# Patient Record
Sex: MALE | Race: WHITE
[De-identification: names, ages, dates, MRNs, and addresses within clinical notes are randomized per-mention and may not be internally consistent; named-entity substitution may affect disease eponyms.]

---

## 2021-08-13 ENCOUNTER — HOSPITAL ENCOUNTER (OUTPATIENT)
Dept: HOSPITAL 76 - SDS | Age: 76
Discharge: HOME | End: 2021-08-13
Attending: SURGERY
Payer: MEDICARE

## 2021-08-13 VITALS — DIASTOLIC BLOOD PRESSURE: 67 MMHG | SYSTOLIC BLOOD PRESSURE: 116 MMHG

## 2021-08-13 DIAGNOSIS — D12.5: ICD-10-CM

## 2021-08-13 DIAGNOSIS — K63.5: ICD-10-CM

## 2021-08-13 DIAGNOSIS — J44.9: ICD-10-CM

## 2021-08-13 DIAGNOSIS — K57.30: ICD-10-CM

## 2021-08-13 DIAGNOSIS — R93.3: Primary | ICD-10-CM

## 2021-08-13 DIAGNOSIS — I48.91: ICD-10-CM

## 2021-08-13 DIAGNOSIS — Z85.820: ICD-10-CM

## 2021-08-13 DIAGNOSIS — D12.2: ICD-10-CM

## 2021-08-13 PROCEDURE — 0DBL8ZZ EXCISION OF TRANSVERSE COLON, VIA NATURAL OR ARTIFICIAL OPENING ENDOSCOPIC: ICD-10-PCS | Performed by: SURGERY

## 2021-08-13 PROCEDURE — 45385 COLONOSCOPY W/LESION REMOVAL: CPT

## 2021-08-13 PROCEDURE — 0DBN8ZZ EXCISION OF SIGMOID COLON, VIA NATURAL OR ARTIFICIAL OPENING ENDOSCOPIC: ICD-10-PCS | Performed by: SURGERY

## 2021-08-13 PROCEDURE — 45380 COLONOSCOPY AND BIOPSY: CPT

## 2021-08-13 PROCEDURE — 0DBK8ZZ EXCISION OF ASCENDING COLON, VIA NATURAL OR ARTIFICIAL OPENING ENDOSCOPIC: ICD-10-PCS | Performed by: SURGERY

## 2021-08-13 NOTE — ANESTHESIA
Pre-Anesthesia VS, & Labs





- Diagnosis





hx of polyps





- Procedure





colonoscopy


Vital Signs: 





                                        











Temp Pulse Resp BP Pulse Ox


 


 37.1 C   68   15   115/79   97 


 


 08/13/21 10:19  08/13/21 10:19  08/13/21 10:19  08/13/21 10:19  08/13/21 10:19











Height: 5 ft 6 in


Weight (kg): 61.1 kg


Body Mass Index: 21.7


BMI Classification: Healthy weight





- NPO


>8 hours





- Lab Results


Lab results reviewed: Yes





Home Medications and Allergies


Home Medications: 


Ambulatory Orders





Apixaban [Eliquis] 5 mg PO BID 08/12/21 


Atorvastatin [Lipitor] 20 mg PO DAILY 08/12/21 


Valsartan 320 mg PO DAILY 08/12/21 


Diltiazem HCl [Diltiazem 12Hr ER] 180 mg PO DAILY 08/13/21 


Fluticasone/Umeclidin/Vilanter [Trelegy Ellipta 200-62.5-25] 1 each IH DAILY 

08/13/21 











                                        





Apixaban [Eliquis] 5 mg PO BID 08/12/21 


Atorvastatin [Lipitor] 20 mg PO DAILY 08/12/21 


Valsartan 320 mg PO DAILY 08/12/21 


Diltiazem HCl [Diltiazem 12Hr ER] 180 mg PO DAILY 08/13/21 


Fluticasone/Umeclidin/Vilanter [Trelegy Ellipta 200-62.5-25] 1 each IH DAILY 

08/13/21 








Allergies/Adverse Reactions: 


                                    Allergies











Allergy/AdvReac Type Severity Reaction Status Date / Time


 


Sulfa (Sulfonamide Allergy  Rash Verified 08/13/21 10:31





Antibiotics)     














Anes History & Medical History





- Anesthetic History


Anesthesia Complications: reports: No previous complications


Family history of Anesthesia Complications: Denies


Family history of Malignant Hyperthermia: Denies





- Medical History


Cardiovascular: reports: Hypertension, High cholesterol


Pulmonary: reports: COPD


Gastrointestinal: reports: None


Urinary: reports: None, Other


Musculoskeletal: reports: None


Endocrine/Autoimmune: reports: None


Skin: reports: Other





- Surgical History


Eyes Ears Nose Throat (EENT): reports: Other


Cardiothoracic: reports: Other





Exam


General: Alert, Oriented x3, Cooperative


Dental: WNL


Mouth Opening: 3 Fingerbreadth


Neck Mobility: Normal


Mallampati classification: II


Thyromental Distance: 4-6 cm


Respiratory: Lungs clear


Cardiovascular: Regular rate





Plan


Anesthesia Type: Total IV


Consent for Procedure(s) Verified and Reviewed: Yes


Code Status: Attempt Resuscitation


ASA classification: 3-Severe systemic disease


Is this case an emergency?: No

## 2023-05-15 ENCOUNTER — HOSPITAL ENCOUNTER (OUTPATIENT)
Dept: HOSPITAL 76 - DI | Age: 78
Discharge: HOME | End: 2023-05-15
Attending: INTERNAL MEDICINE
Payer: MEDICARE

## 2023-05-15 DIAGNOSIS — R91.8: Primary | ICD-10-CM

## 2023-05-24 ENCOUNTER — HOSPITAL ENCOUNTER (OUTPATIENT)
Dept: HOSPITAL 76 - LAB.N | Age: 78
Discharge: HOME | End: 2023-05-24
Attending: NURSE PRACTITIONER
Payer: MEDICARE

## 2023-05-24 DIAGNOSIS — M25.461: Primary | ICD-10-CM

## 2023-05-24 LAB
CLARITY FLD: (no result)
COLOR FLD: YELLOW
MONOCYTES NFR FLD: 6 %
NEUTROPHILS NFR FLD MANUAL: 94 %
RBC # FLD: 1000 /MM^3
SPECIMEN SOURCE FLD: (no result)
WBC OTHER NFR FLD: 7320 /MM^3

## 2023-05-24 PROCEDURE — 87070 CULTURE OTHR SPECIMN AEROBIC: CPT

## 2023-05-24 PROCEDURE — 89060 EXAM SYNOVIAL FLUID CRYSTALS: CPT

## 2023-05-24 PROCEDURE — 87205 SMEAR GRAM STAIN: CPT

## 2023-05-24 PROCEDURE — 89051 BODY FLUID CELL COUNT: CPT

## 2023-05-24 PROCEDURE — 82945 GLUCOSE OTHER FLUID: CPT

## 2023-05-31 ENCOUNTER — HOSPITAL ENCOUNTER (OUTPATIENT)
Dept: HOSPITAL 76 - DI.N | Age: 78
Discharge: HOME | End: 2023-05-31
Attending: NURSE PRACTITIONER
Payer: MEDICARE

## 2023-05-31 DIAGNOSIS — M25.461: Primary | ICD-10-CM

## 2024-05-06 ENCOUNTER — HOSPITAL ENCOUNTER (EMERGENCY)
Dept: HOSPITAL 76 - ED | Age: 79
Discharge: HOME | End: 2024-05-06
Payer: MEDICARE

## 2024-05-06 VITALS — OXYGEN SATURATION: 96 % | DIASTOLIC BLOOD PRESSURE: 82 MMHG | SYSTOLIC BLOOD PRESSURE: 136 MMHG

## 2024-05-06 DIAGNOSIS — Z79.01: ICD-10-CM

## 2024-05-06 DIAGNOSIS — Z79.899: ICD-10-CM

## 2024-05-06 DIAGNOSIS — I10: ICD-10-CM

## 2024-05-06 DIAGNOSIS — J18.9: Primary | ICD-10-CM

## 2024-05-06 DIAGNOSIS — J44.9: ICD-10-CM

## 2024-05-06 DIAGNOSIS — E78.00: ICD-10-CM

## 2024-05-06 LAB
ALBUMIN DIAFP-MCNC: 3.6 G/DL (ref 3.2–5.5)
ALBUMIN/GLOB SERPL: 1.4 {RATIO} (ref 1–2.2)
ALP SERPL-CCNC: 74 IU/L (ref 42–121)
ALT SERPL W P-5'-P-CCNC: 15 IU/L (ref 10–60)
ANION GAP SERPL CALCULATED.4IONS-SCNC: 5 MMOL/L (ref 6–13)
AST SERPL W P-5'-P-CCNC: 16 IU/L (ref 10–42)
BASOPHILS NFR BLD AUTO: 0.1 10^3/UL (ref 0–0.1)
BASOPHILS NFR BLD AUTO: 0.5 %
BILIRUB BLD-MCNC: 0.4 MG/DL (ref 0.2–1)
BUN SERPL-MCNC: 19 MG/DL (ref 6–20)
CALCIUM UR-MCNC: 9.7 MG/DL (ref 8.5–10.3)
CHLORIDE SERPL-SCNC: 106 MMOL/L (ref 101–111)
CLARITY UR REFRACT.AUTO: CLEAR
CO2 SERPL-SCNC: 32 MMOL/L (ref 21–32)
CREAT SERPLBLD-SCNC: 1.1 MG/DL (ref 0.6–1.3)
EOSINOPHIL # BLD AUTO: 0.1 10^3/UL (ref 0–0.7)
EOSINOPHIL NFR BLD AUTO: 0.6 %
ERYTHROCYTE [DISTWIDTH] IN BLOOD BY AUTOMATED COUNT: 14 % (ref 12–15)
GFRSERPLBLD MDRD-ARVRAT: 65 ML/MIN/{1.73_M2} (ref 89–?)
GLOBULIN SER-MCNC: 2.6 G/DL (ref 2.1–4.2)
GLUCOSE SERPL-MCNC: 133 MG/DL (ref 74–104)
GLUCOSE UR QL STRIP.AUTO: NEGATIVE MG/DL
HCT VFR BLD AUTO: 48.6 % (ref 42–52)
HGB UR QL STRIP: 15.1 G/DL (ref 14–18)
KETONES UR QL STRIP.AUTO: (no result) MG/DL
LIPASE SERPL-CCNC: 27 U/L (ref 11–82)
LYMPHOCYTES # SPEC AUTO: 1.1 10^3/UL (ref 1.5–3.5)
LYMPHOCYTES NFR BLD AUTO: 8.9 %
MCH RBC QN AUTO: 29.2 PG (ref 27–31)
MCHC RBC AUTO-ENTMCNC: 31.1 G/DL (ref 32–36)
MCV RBC AUTO: 94 FL (ref 80–94)
MONOCYTES # BLD AUTO: 0.9 10^3/UL (ref 0–1)
MONOCYTES NFR BLD AUTO: 7.5 %
NEUTROPHILS # BLD AUTO: 10.2 10^3/UL (ref 1.5–6.6)
NEUTROPHILS # SNV AUTO: 12.4 X10^3/UL (ref 4.8–10.8)
NEUTROPHILS NFR BLD AUTO: 81.9 %
NITRITE UR QL STRIP.AUTO: NEGATIVE
NRBC # BLD AUTO: 0 /100WBC
NRBC # BLD AUTO: 0 X10^3/UL
PDW BLD AUTO: 8.9 FL (ref 7.4–11.4)
PH UR STRIP.AUTO: 6 PH (ref 5–7.5)
PLATELET # BLD: 359 10^3/UL (ref 130–450)
POTASSIUM SERPL-SCNC: 4.6 MMOL/L (ref 3.5–4.5)
PROT SPEC-MCNC: 6.2 G/DL (ref 6.4–8.9)
PROT UR STRIP.AUTO-MCNC: (no result) MG/DL
RBC # UR STRIP.AUTO: NEGATIVE /UL
RBC MAR: 5.17 10^6/UL (ref 4.7–6.1)
SODIUM SERPLBLD-SCNC: 143 MMOL/L (ref 135–145)
SP GR UR STRIP.AUTO: 1.02 (ref 1–1.03)
UROBILINOGEN UR QL STRIP.AUTO: (no result) E.U./DL
UROBILINOGEN UR STRIP.AUTO-MCNC: NEGATIVE MG/DL

## 2024-05-06 PROCEDURE — 81003 URINALYSIS AUTO W/O SCOPE: CPT

## 2024-05-06 PROCEDURE — 85025 COMPLETE CBC W/AUTO DIFF WBC: CPT

## 2024-05-06 PROCEDURE — 99284 EMERGENCY DEPT VISIT MOD MDM: CPT

## 2024-05-06 PROCEDURE — 83690 ASSAY OF LIPASE: CPT

## 2024-05-06 PROCEDURE — 87086 URINE CULTURE/COLONY COUNT: CPT

## 2024-05-06 PROCEDURE — 36415 COLL VENOUS BLD VENIPUNCTURE: CPT

## 2024-05-06 PROCEDURE — 80053 COMPREHEN METABOLIC PANEL: CPT

## 2024-05-06 PROCEDURE — 74177 CT ABD & PELVIS W/CONTRAST: CPT

## 2024-05-06 PROCEDURE — 71045 X-RAY EXAM CHEST 1 VIEW: CPT

## 2024-05-06 PROCEDURE — 81001 URINALYSIS AUTO W/SCOPE: CPT

## 2024-05-15 ENCOUNTER — HOSPITAL ENCOUNTER (EMERGENCY)
Dept: HOSPITAL 76 - ED | Age: 79
Discharge: HOME | End: 2024-05-15
Payer: MEDICARE

## 2024-05-15 VITALS — OXYGEN SATURATION: 94 % | SYSTOLIC BLOOD PRESSURE: 124 MMHG | DIASTOLIC BLOOD PRESSURE: 74 MMHG

## 2024-05-15 DIAGNOSIS — I71.21: ICD-10-CM

## 2024-05-15 DIAGNOSIS — R94.31: ICD-10-CM

## 2024-05-15 DIAGNOSIS — I25.10: ICD-10-CM

## 2024-05-15 DIAGNOSIS — R91.8: ICD-10-CM

## 2024-05-15 DIAGNOSIS — I10: ICD-10-CM

## 2024-05-15 DIAGNOSIS — I48.91: ICD-10-CM

## 2024-05-15 DIAGNOSIS — Z79.01: ICD-10-CM

## 2024-05-15 DIAGNOSIS — J18.9: Primary | ICD-10-CM

## 2024-05-15 DIAGNOSIS — J44.0: ICD-10-CM

## 2024-05-15 DIAGNOSIS — M54.6: ICD-10-CM

## 2024-05-15 DIAGNOSIS — I45.10: ICD-10-CM

## 2024-05-15 LAB
ALBUMIN DIAFP-MCNC: 3.7 G/DL (ref 3.2–5.5)
ALBUMIN/GLOB SERPL: 1.4 {RATIO} (ref 1–2.2)
ALP SERPL-CCNC: 66 IU/L (ref 42–121)
ALT SERPL W P-5'-P-CCNC: 16 IU/L (ref 10–60)
ANION GAP SERPL CALCULATED.4IONS-SCNC: 5 MMOL/L (ref 6–13)
AST SERPL W P-5'-P-CCNC: 21 IU/L (ref 10–42)
BASOPHILS NFR BLD AUTO: 0 10^3/UL (ref 0–0.1)
BASOPHILS NFR BLD AUTO: 0.3 %
BILIRUB BLD-MCNC: 0.5 MG/DL (ref 0.2–1)
BUN SERPL-MCNC: 24 MG/DL (ref 6–20)
CALCIUM UR-MCNC: 9.5 MG/DL (ref 8.5–10.3)
CARDIAC TROPONIN I PNL SERPL HS: 19.3 NG/L (ref 2.3–19.7)
CHLORIDE SERPL-SCNC: 104 MMOL/L (ref 101–111)
CO2 SERPL-SCNC: 30 MMOL/L (ref 21–32)
CREAT SERPLBLD-SCNC: 1.1 MG/DL (ref 0.6–1.3)
EOSINOPHIL # BLD AUTO: 0.2 10^3/UL (ref 0–0.7)
EOSINOPHIL NFR BLD AUTO: 2.4 %
ERYTHROCYTE [DISTWIDTH] IN BLOOD BY AUTOMATED COUNT: 14.6 % (ref 12–15)
GFRSERPLBLD MDRD-ARVRAT: 65 ML/MIN/{1.73_M2} (ref 89–?)
GLOBULIN SER-MCNC: 2.7 G/DL (ref 2.1–4.2)
GLUCOSE SERPL-MCNC: 109 MG/DL (ref 74–104)
HCT VFR BLD AUTO: 49.8 % (ref 42–52)
HGB UR QL STRIP: 15.4 G/DL (ref 14–18)
LIPASE SERPL-CCNC: 27 U/L (ref 11–82)
LYMPHOCYTES # SPEC AUTO: 1 10^3/UL (ref 1.5–3.5)
LYMPHOCYTES NFR BLD AUTO: 11.7 %
MCH RBC QN AUTO: 29.2 PG (ref 27–31)
MCHC RBC AUTO-ENTMCNC: 30.9 G/DL (ref 32–36)
MCV RBC AUTO: 94.3 FL (ref 80–94)
MONOCYTES # BLD AUTO: 0.5 10^3/UL (ref 0–1)
MONOCYTES NFR BLD AUTO: 5.4 %
NEUTROPHILS # BLD AUTO: 7.1 10^3/UL (ref 1.5–6.6)
NEUTROPHILS # SNV AUTO: 8.9 X10^3/UL (ref 4.8–10.8)
NEUTROPHILS NFR BLD AUTO: 79.8 %
NRBC # BLD AUTO: 0 /100WBC
NRBC # BLD AUTO: 0 X10^3/UL
PDW BLD AUTO: 9.8 FL (ref 7.4–11.4)
PLATELET # BLD: 174 10^3/UL (ref 130–450)
POTASSIUM SERPL-SCNC: 4.8 MMOL/L (ref 3.5–4.5)
PROT SPEC-MCNC: 6.4 G/DL (ref 6.4–8.9)
RBC MAR: 5.28 10^6/UL (ref 4.7–6.1)
SODIUM SERPLBLD-SCNC: 139 MMOL/L (ref 135–145)

## 2024-05-15 PROCEDURE — 80053 COMPREHEN METABOLIC PANEL: CPT

## 2024-05-15 PROCEDURE — 99284 EMERGENCY DEPT VISIT MOD MDM: CPT

## 2024-05-15 PROCEDURE — 84484 ASSAY OF TROPONIN QUANT: CPT

## 2024-05-15 PROCEDURE — 93005 ELECTROCARDIOGRAM TRACING: CPT

## 2024-05-15 PROCEDURE — 36415 COLL VENOUS BLD VENIPUNCTURE: CPT

## 2024-05-15 PROCEDURE — 71045 X-RAY EXAM CHEST 1 VIEW: CPT

## 2024-05-15 PROCEDURE — 96375 TX/PRO/DX INJ NEW DRUG ADDON: CPT

## 2024-05-15 PROCEDURE — 96374 THER/PROPH/DIAG INJ IV PUSH: CPT

## 2024-05-15 PROCEDURE — 99283 EMERGENCY DEPT VISIT LOW MDM: CPT

## 2024-05-15 PROCEDURE — 85025 COMPLETE CBC W/AUTO DIFF WBC: CPT

## 2024-05-15 PROCEDURE — 83690 ASSAY OF LIPASE: CPT

## 2024-05-15 PROCEDURE — 71275 CT ANGIOGRAPHY CHEST: CPT

## 2024-05-15 PROCEDURE — 96376 TX/PRO/DX INJ SAME DRUG ADON: CPT

## 2024-05-15 RX ADMIN — KETOROLAC TROMETHAMINE STA MG: 15 INJECTION, SOLUTION INTRAMUSCULAR; INTRAVENOUS at 07:57

## 2024-05-15 RX ADMIN — ONDANSETRON STA MG: 2 INJECTION INTRAMUSCULAR; INTRAVENOUS at 09:01

## 2024-05-15 RX ADMIN — HYDROMORPHONE HYDROCHLORIDE STA MG: 1 INJECTION, SOLUTION INTRAMUSCULAR; INTRAVENOUS; SUBCUTANEOUS at 07:57

## 2024-05-15 RX ADMIN — HYDROMORPHONE HYDROCHLORIDE STA MG: 1 INJECTION, SOLUTION INTRAMUSCULAR; INTRAVENOUS; SUBCUTANEOUS at 10:13

## 2024-06-10 ENCOUNTER — HOSPITAL ENCOUNTER (OUTPATIENT)
Dept: HOSPITAL 76 - DI | Age: 79
Discharge: HOME | End: 2024-06-10
Attending: FAMILY MEDICINE
Payer: MEDICARE

## 2024-06-10 DIAGNOSIS — J18.9: Primary | ICD-10-CM

## 2024-06-10 DIAGNOSIS — J90: ICD-10-CM

## 2024-06-25 ENCOUNTER — HOSPITAL ENCOUNTER (INPATIENT)
Dept: HOSPITAL 76 - ED | Age: 79
LOS: 4 days | Discharge: ANOTHER HOSPITAL | DRG: 175 | End: 2024-06-29
Attending: PHYSICIAN ASSISTANT | Admitting: PHYSICIAN ASSISTANT
Payer: MEDICARE

## 2024-06-25 DIAGNOSIS — I71.20: ICD-10-CM

## 2024-06-25 DIAGNOSIS — M62.84: ICD-10-CM

## 2024-06-25 DIAGNOSIS — E44.0: ICD-10-CM

## 2024-06-25 DIAGNOSIS — R91.8: ICD-10-CM

## 2024-06-25 DIAGNOSIS — J96.01: ICD-10-CM

## 2024-06-25 DIAGNOSIS — D69.6: ICD-10-CM

## 2024-06-25 DIAGNOSIS — J18.9: ICD-10-CM

## 2024-06-25 DIAGNOSIS — Z79.899: ICD-10-CM

## 2024-06-25 DIAGNOSIS — I11.9: ICD-10-CM

## 2024-06-25 DIAGNOSIS — Z66: ICD-10-CM

## 2024-06-25 DIAGNOSIS — Z86.79: ICD-10-CM

## 2024-06-25 DIAGNOSIS — I26.94: Primary | ICD-10-CM

## 2024-06-25 DIAGNOSIS — Z20.828: ICD-10-CM

## 2024-06-25 DIAGNOSIS — Z20.822: ICD-10-CM

## 2024-06-25 DIAGNOSIS — I5A: ICD-10-CM

## 2024-06-25 DIAGNOSIS — I10: ICD-10-CM

## 2024-06-25 DIAGNOSIS — Z20.818: ICD-10-CM

## 2024-06-25 DIAGNOSIS — J44.0: ICD-10-CM

## 2024-06-25 DIAGNOSIS — I45.2: ICD-10-CM

## 2024-06-25 DIAGNOSIS — Z79.01: ICD-10-CM

## 2024-06-25 DIAGNOSIS — R94.31: ICD-10-CM

## 2024-06-25 DIAGNOSIS — Z87.891: ICD-10-CM

## 2024-06-25 DIAGNOSIS — E78.00: ICD-10-CM

## 2024-06-25 DIAGNOSIS — J90: ICD-10-CM

## 2024-06-25 DIAGNOSIS — I48.0: ICD-10-CM

## 2024-06-25 DIAGNOSIS — F41.9: ICD-10-CM

## 2024-06-25 DIAGNOSIS — I25.10: ICD-10-CM

## 2024-06-25 LAB
ALBUMIN DIAFP-MCNC: 4 G/DL (ref 3.2–5.5)
ALBUMIN/GLOB SERPL: 1.4 {RATIO} (ref 1–2.2)
ALP SERPL-CCNC: 85 IU/L (ref 42–121)
ALT SERPL W P-5'-P-CCNC: 13 IU/L (ref 10–60)
ANION GAP SERPL CALCULATED.4IONS-SCNC: 7 MMOL/L (ref 6–13)
APTT PPP: 33.6 SECS (ref 24.9–33.3)
AST SERPL W P-5'-P-CCNC: 26 IU/L (ref 10–42)
B PARAPERT DNA SPEC QL NAA+PROBE: NOT DETECTED
B PERT DNA SPEC QL NAA+PROBE: NOT DETECTED
BASE EXCESS BLDV CALC-SCNC: 0.9 MMOL/L
BASOPHILS NFR BLD AUTO: 0 10^3/UL (ref 0–0.1)
BASOPHILS NFR BLD AUTO: 0.4 %
BILIRUB BLD-MCNC: 1 MG/DL (ref 0.2–1)
BUN SERPL-MCNC: 15 MG/DL (ref 6–20)
C PNEUM DNA NPH QL NAA+NON-PROBE: NOT DETECTED
CALCIUM UR-MCNC: 9.5 MG/DL (ref 8.5–10.3)
CHLORIDE SERPL-SCNC: 102 MMOL/L (ref 101–111)
CO2 BLDV-SCNC: 25.9 MMOL/L (ref 24–29)
CO2 SERPL-SCNC: 29 MMOL/L (ref 21–32)
CREAT SERPLBLD-SCNC: 0.9 MG/DL (ref 0.6–1.3)
EOSINOPHIL # BLD AUTO: 0 10^3/UL (ref 0–0.7)
EOSINOPHIL NFR BLD AUTO: 0.5 %
ERYTHROCYTE [DISTWIDTH] IN BLOOD BY AUTOMATED COUNT: 14.5 % (ref 12–15)
FLUAV RNA RESP QL NAA+PROBE: NOT DETECTED
GFRSERPLBLD MDRD-ARVRAT: 82 ML/MIN/{1.73_M2} (ref 89–?)
GLOBULIN SER-MCNC: 2.8 G/DL (ref 2.1–4.2)
GLUCOSE SERPL-MCNC: 100 MG/DL (ref 74–104)
HAEM INFLU B DNA SPEC QL NAA+PROBE: NOT DETECTED
HCO3 BLDMV-SCNC: 24.7 MMOL/L (ref 23–28)
HCOV 229E RNA SPEC QL NAA+PROBE: NOT DETECTED
HCOV HKU1 RNA UPPER RESP QL NAA+PROBE: NOT DETECTED
HCOV NL63 RNA ASPIRATE QL NAA+PROBE: NOT DETECTED
HCOV OC43 RNA SPEC QL NAA+PROBE: NOT DETECTED
HCT VFR BLD AUTO: 50.6 % (ref 42–52)
HGB UR QL STRIP: 16.3 G/DL (ref 14–18)
HMPV AG SPEC QL: DETECTED
HPIV1 RNA NPH QL NAA+PROBE: NOT DETECTED
HPIV2 SPEC QL CULT: NOT DETECTED
HPIV3 AB TITR SER CF: NOT DETECTED {TITER}
HPIV4 RNA SPEC QL NAA+PROBE: NOT DETECTED
INR PPP: 1.5 (ref 0.8–1.2)
INR PPP: 1.7 (ref 0.8–1.2)
LIPASE SERPL-CCNC: 21 U/L (ref 11–82)
LYMPHOCYTES # SPEC AUTO: 0.7 10^3/UL (ref 1.5–3.5)
LYMPHOCYTES NFR BLD AUTO: 9.9 %
M PNEUMO DNA SPEC QL NAA+PROBE: NOT DETECTED
MCH RBC QN AUTO: 29.2 PG (ref 27–31)
MCHC RBC AUTO-ENTMCNC: 32.2 G/DL (ref 32–36)
MCV RBC AUTO: 90.5 FL (ref 80–94)
MONOCYTES # BLD AUTO: 0.6 10^3/UL (ref 0–1)
MONOCYTES NFR BLD AUTO: 8.6 %
NEUTROPHILS # BLD AUTO: 6 10^3/UL (ref 1.5–6.6)
NEUTROPHILS # SNV AUTO: 7.5 X10^3/UL (ref 4.8–10.8)
NEUTROPHILS NFR BLD AUTO: 80.3 %
NRBC # BLD AUTO: 0 /100WBC
NRBC # BLD AUTO: 0 X10^3/UL
PCO2 BLDV: 37.3 MMHG (ref 41–51)
PDW BLD AUTO: 9.8 FL (ref 7.4–11.4)
PH BLDV: 7.44 [PH] (ref 7.31–7.41)
PLATELET # BLD: 203 10^3/UL (ref 130–450)
PO2 BLDV: 46.2 MMHG (ref 25–47)
POTASSIUM SERPL-SCNC: 4.2 MMOL/L (ref 3.5–4.5)
PROT SPEC-MCNC: 6.8 G/DL (ref 6.4–8.9)
PROTHROM ACT/NOR PPP: 16.3 SECS (ref 9.9–12.6)
PROTHROM ACT/NOR PPP: 17.6 SECS (ref 9.9–12.6)
RBC MAR: 5.59 10^6/UL (ref 4.7–6.1)
RSV RNA RESP QL NAA+PROBE: NOT DETECTED
RV+EV RNA SPEC QL NAA+PROBE: NOT DETECTED
SAO2 DF BLDV: 85.5 % (ref 60–80)
SARS-COV-2 RNA PNL SPEC NAA+PROBE: NOT DETECTED
SODIUM SERPLBLD-SCNC: 138 MMOL/L (ref 135–145)

## 2024-06-25 PROCEDURE — 85613 RUSSELL VIPER VENOM DILUTED: CPT

## 2024-06-25 PROCEDURE — 80048 BASIC METABOLIC PNL TOTAL CA: CPT

## 2024-06-25 PROCEDURE — 96374 THER/PROPH/DIAG INJ IV PUSH: CPT

## 2024-06-25 PROCEDURE — 97162 PT EVAL MOD COMPLEX 30 MIN: CPT

## 2024-06-25 PROCEDURE — 87640 STAPH A DNA AMP PROBE: CPT

## 2024-06-25 PROCEDURE — 97166 OT EVAL MOD COMPLEX 45 MIN: CPT

## 2024-06-25 PROCEDURE — 87205 SMEAR GRAM STAIN: CPT

## 2024-06-25 PROCEDURE — 96375 TX/PRO/DX INJ NEW DRUG ADDON: CPT

## 2024-06-25 PROCEDURE — 85732 THROMBOPLASTIN TIME PARTIAL: CPT

## 2024-06-25 PROCEDURE — 97530 THERAPEUTIC ACTIVITIES: CPT

## 2024-06-25 PROCEDURE — 99285 EMERGENCY DEPT VISIT HI MDM: CPT

## 2024-06-25 PROCEDURE — 85598 HEXAGNAL PHOSPH PLTLT NEUTRL: CPT

## 2024-06-25 PROCEDURE — 82803 BLOOD GASES ANY COMBINATION: CPT

## 2024-06-25 PROCEDURE — 85610 PROTHROMBIN TIME: CPT

## 2024-06-25 PROCEDURE — 85025 COMPLETE CBC W/AUTO DIFF WBC: CPT

## 2024-06-25 PROCEDURE — 93307 TTE W/O DOPPLER COMPLETE: CPT

## 2024-06-25 PROCEDURE — 97116 GAIT TRAINING THERAPY: CPT

## 2024-06-25 PROCEDURE — 80053 COMPREHEN METABOLIC PANEL: CPT

## 2024-06-25 PROCEDURE — 71045 X-RAY EXAM CHEST 1 VIEW: CPT

## 2024-06-25 PROCEDURE — 86147 CARDIOLIPIN ANTIBODY EA IG: CPT

## 2024-06-25 PROCEDURE — 94640 AIRWAY INHALATION TREATMENT: CPT

## 2024-06-25 PROCEDURE — 36415 COLL VENOUS BLD VENIPUNCTURE: CPT

## 2024-06-25 PROCEDURE — 84484 ASSAY OF TROPONIN QUANT: CPT

## 2024-06-25 PROCEDURE — 93005 ELECTROCARDIOGRAM TRACING: CPT

## 2024-06-25 PROCEDURE — 87633 RESP VIRUS 12-25 TARGETS: CPT

## 2024-06-25 PROCEDURE — 93970 EXTREMITY STUDY: CPT

## 2024-06-25 PROCEDURE — 71275 CT ANGIOGRAPHY CHEST: CPT

## 2024-06-25 PROCEDURE — 85730 THROMBOPLASTIN TIME PARTIAL: CPT

## 2024-06-25 PROCEDURE — 83690 ASSAY OF LIPASE: CPT

## 2024-06-25 PROCEDURE — 87070 CULTURE OTHR SPECIMN AEROBIC: CPT

## 2024-06-25 RX ADMIN — METHYLPREDNISOLONE SODIUM SUCCINATE STA: 125 INJECTION, POWDER, FOR SOLUTION INTRAMUSCULAR; INTRAVENOUS at 10:42

## 2024-06-25 RX ADMIN — METHYLPREDNISOLONE SODIUM SUCCINATE STA MG: 125 INJECTION, POWDER, FOR SOLUTION INTRAMUSCULAR; INTRAVENOUS at 10:58

## 2024-06-25 RX ADMIN — BUDESONIDE SCH MG: 0.5 SUSPENSION RESPIRATORY (INHALATION) at 19:04

## 2024-06-25 RX ADMIN — SODIUM CHLORIDE STA MLS/HR: 9 INJECTION, SOLUTION INTRAVENOUS at 18:10

## 2024-06-25 RX ADMIN — ATORVASTATIN CALCIUM SCH MG: 40 TABLET, FILM COATED ORAL at 21:01

## 2024-06-25 RX ADMIN — WARFARIN SODIUM SCH MG: 2.5 TABLET ORAL at 21:00

## 2024-06-25 RX ADMIN — DEXTROSE MONOHYDRATE STA MLS/HR: 50 INJECTION, SOLUTION INTRAVENOUS at 17:25

## 2024-06-25 RX ADMIN — ONDANSETRON STA MG: 2 INJECTION INTRAMUSCULAR; INTRAVENOUS at 10:53

## 2024-06-25 RX ADMIN — FORMOTEROL FUMARATE DIHYDRATE SCH MCG: 20 SOLUTION RESPIRATORY (INHALATION) at 19:04

## 2024-06-25 RX ADMIN — HEPARIN SODIUM SCH MLS/HR: 5000 INJECTION, SOLUTION INTRAVENOUS at 16:45

## 2024-06-26 LAB
ANION GAP SERPL CALCULATED.4IONS-SCNC: 6 MMOL/L (ref 6–13)
BASOPHILS NFR BLD AUTO: 0 10^3/UL (ref 0–0.1)
BASOPHILS NFR BLD AUTO: 0.2 %
BUN SERPL-MCNC: 25 MG/DL (ref 6–20)
CALCIUM UR-MCNC: 8.8 MG/DL (ref 8.5–10.3)
CARDIAC TROPONIN I PNL SERPL HS: 47.4 NG/L (ref 2.3–19.7)
CHLORIDE SERPL-SCNC: 107 MMOL/L (ref 101–111)
CO2 SERPL-SCNC: 28 MMOL/L (ref 21–32)
CREAT SERPLBLD-SCNC: 0.9 MG/DL (ref 0.6–1.3)
EOSINOPHIL # BLD AUTO: 0 10^3/UL (ref 0–0.7)
EOSINOPHIL NFR BLD AUTO: 0 %
ERYTHROCYTE [DISTWIDTH] IN BLOOD BY AUTOMATED COUNT: 14.5 % (ref 12–15)
GFRSERPLBLD MDRD-ARVRAT: 82 ML/MIN/{1.73_M2} (ref 89–?)
GLUCOSE SERPL-MCNC: 153 MG/DL (ref 74–104)
HCT VFR BLD AUTO: 44.6 % (ref 42–52)
HGB UR QL STRIP: 14.5 G/DL (ref 14–18)
INR PPP: 1.4 (ref 0.8–1.2)
LYMPHOCYTES # SPEC AUTO: 0.6 10^3/UL (ref 1.5–3.5)
LYMPHOCYTES NFR BLD AUTO: 13.5 %
MANUAL DIF COMMENT BLD-IMP: (no result)
MCH RBC QN AUTO: 30 PG (ref 27–31)
MCHC RBC AUTO-ENTMCNC: 32.5 G/DL (ref 32–36)
MCV RBC AUTO: 92.1 FL (ref 80–94)
MONOCYTES # BLD AUTO: 0.3 10^3/UL (ref 0–1)
MONOCYTES NFR BLD AUTO: 7.3 %
NEUTROPHILS # BLD AUTO: 3.5 10^3/UL (ref 1.5–6.6)
NEUTROPHILS # SNV AUTO: 4.5 X10^3/UL (ref 4.8–10.8)
NEUTROPHILS NFR BLD AUTO: 78.6 %
NRBC # BLD AUTO: 0 /100WBC
NRBC # BLD AUTO: 0 X10^3/UL
PDW BLD AUTO: 10.4 FL (ref 7.4–11.4)
PLAT MORPH BLD: (no result)
PLATELET # BLD: 153 10^3/UL (ref 130–450)
PLATELET BLD QL SMEAR: (no result)
POTASSIUM SERPL-SCNC: 4.4 MMOL/L (ref 3.5–4.5)
PROTHROM ACT/NOR PPP: 15.6 SECS (ref 9.9–12.6)
RBC MAR: 4.84 10^6/UL (ref 4.7–6.1)
RBC MORPH BLD: (no result)
SODIUM SERPLBLD-SCNC: 141 MMOL/L (ref 135–145)

## 2024-06-26 RX ADMIN — ATORVASTATIN CALCIUM SCH MG: 40 TABLET, FILM COATED ORAL at 08:51

## 2024-06-26 RX ADMIN — Medication SCH CAP: at 16:59

## 2024-06-26 RX ADMIN — IPRATROPIUM BROMIDE SCH MG: 0.5 SOLUTION RESPIRATORY (INHALATION) at 07:19

## 2024-06-26 RX ADMIN — HEPARIN SODIUM PRN UNIT: 1000 INJECTION, SOLUTION INTRAVENOUS; SUBCUTANEOUS at 00:48

## 2024-06-26 RX ADMIN — SODIUM CHLORIDE SCH MLS/HR: 900 INJECTION INTRAVENOUS at 10:44

## 2024-06-26 RX ADMIN — SODIUM CHLORIDE, PRESERVATIVE FREE SCH ML: 5 INJECTION INTRAVENOUS at 08:51

## 2024-06-26 RX ADMIN — SODIUM CHLORIDE SCH MLS/HR: 9 INJECTION, SOLUTION INTRAVENOUS at 08:51

## 2024-06-27 LAB
ANION GAP SERPL CALCULATED.4IONS-SCNC: 5 MMOL/L (ref 6–13)
BASOPHILS # BLD MANUAL: 0 10^3/UL (ref 0–0.1)
BASOPHILS NFR BLD AUTO: 0.1 %
BUN SERPL-MCNC: 22 MG/DL (ref 6–20)
CALCIUM UR-MCNC: 8.6 MG/DL (ref 8.5–10.3)
CHLORIDE SERPL-SCNC: 107 MMOL/L (ref 101–111)
CO2 SERPL-SCNC: 30 MMOL/L (ref 21–32)
CREAT SERPLBLD-SCNC: 0.8 MG/DL (ref 0.6–1.3)
EOSINOPHIL # BLD MANUAL: 0.1 10^3/UL (ref 0–0.7)
EOSINOPHIL NFR BLD AUTO: 0.1 %
ERYTHROCYTE [DISTWIDTH] IN BLOOD BY AUTOMATED COUNT: 14.6 % (ref 12–15)
GFRSERPLBLD MDRD-ARVRAT: 93 ML/MIN/{1.73_M2} (ref 89–?)
GLUCOSE SERPL-MCNC: 96 MG/DL (ref 74–104)
HCT VFR BLD AUTO: 44.9 % (ref 42–52)
HGB UR QL STRIP: 14.3 G/DL (ref 14–18)
INR PPP: 2.3 (ref 0.8–1.2)
LYMPH ABN NFR BLD MANUAL: 0 %
LYMPHOBLASTS # BLD: 13 %
LYMPHOCYTES # BLD MANUAL: 0.9 10^3/UL (ref 1.5–3.5)
LYMPHOCYTES NFR BLD AUTO: 15.7 %
MANUAL DIF COMMENT BLD-IMP: (no result)
MCH RBC QN AUTO: 29.3 PG (ref 27–31)
MCHC RBC AUTO-ENTMCNC: 31.8 G/DL (ref 32–36)
MCV RBC AUTO: 92 FL (ref 80–94)
MONOCYTES # BLD MANUAL: 0.5 10^3/UL (ref 0–1)
MONOCYTES NFR BLD AUTO: 7.1 %
NEUTROPHILS # SNV AUTO: 6.7 X10^3/UL (ref 4.8–10.8)
NEUTROPHILS NFR BLD AUTO: 76.7 %
NEUTROPHILS NFR BLD MANUAL: 5.2 10^3/UL (ref 1.5–6.6)
NEUTS BAND NFR BLD: 1 %
PDW BLD AUTO: 9.7 FL (ref 7.4–11.4)
PLATELET # BLD: 131 10^3/UL (ref 130–450)
PLATELET BLD QL SMEAR: (no result)
POTASSIUM SERPL-SCNC: 3.9 MMOL/L (ref 3.5–4.5)
PROTHROM ACT/NOR PPP: 23.6 SECS (ref 9.9–12.6)
RBC MAR: 4.88 10^6/UL (ref 4.7–6.1)
RBC MORPH BLD: (no result)
SODIUM SERPLBLD-SCNC: 142 MMOL/L (ref 135–145)

## 2024-06-27 RX ADMIN — IPRATROPIUM BROMIDE AND ALBUTEROL SULFATE PRN ML: 2.5; .5 SOLUTION RESPIRATORY (INHALATION) at 11:28

## 2024-06-27 RX ADMIN — IPRATROPIUM BROMIDE AND ALBUTEROL SULFATE SCH ML: 2.5; .5 SOLUTION RESPIRATORY (INHALATION) at 16:15

## 2024-06-27 RX ADMIN — Medication SCH TAB: at 08:56

## 2024-06-27 RX ADMIN — LOSARTAN POTASSIUM SCH MG: 50 TABLET, FILM COATED ORAL at 09:09

## 2024-06-27 RX ADMIN — ACETAMINOPHEN PRN MG: 325 TABLET ORAL at 05:14

## 2024-06-27 RX ADMIN — WARFARIN SODIUM SCH MG: 1 TABLET ORAL at 14:19

## 2024-06-28 LAB
ANION GAP SERPL CALCULATED.4IONS-SCNC: 4 MMOL/L (ref 6–13)
BASOPHILS # BLD MANUAL: 0 10^3/UL (ref 0–0.1)
BASOPHILS NFR BLD AUTO: 0.4 %
BUN SERPL-MCNC: 19 MG/DL (ref 6–20)
CALCIUM UR-MCNC: 8.5 MG/DL (ref 8.5–10.3)
CHLORIDE SERPL-SCNC: 107 MMOL/L (ref 101–111)
CO2 SERPL-SCNC: 30 MMOL/L (ref 21–32)
CREAT SERPLBLD-SCNC: 0.8 MG/DL (ref 0.6–1.3)
EOSINOPHIL # BLD MANUAL: 0 10^3/UL (ref 0–0.7)
EOSINOPHIL NFR BLD AUTO: 0.9 %
ERYTHROCYTE [DISTWIDTH] IN BLOOD BY AUTOMATED COUNT: 14.4 % (ref 12–15)
GFRSERPLBLD MDRD-ARVRAT: 93 ML/MIN/{1.73_M2} (ref 89–?)
GLUCOSE SERPL-MCNC: 94 MG/DL (ref 74–104)
HCT VFR BLD AUTO: 44.1 % (ref 42–52)
HGB UR QL STRIP: 13.7 G/DL (ref 14–18)
INR PPP: 5 (ref 0.8–1.2)
LYMPH ABN NFR BLD MANUAL: 0 %
LYMPHOBLASTS # BLD: 35 %
LYMPHOCYTES # BLD MANUAL: 1.6 10^3/UL (ref 1.5–3.5)
LYMPHOCYTES NFR BLD AUTO: 24.2 %
MANUAL DIF COMMENT BLD-IMP: (no result)
MCH RBC QN AUTO: 28.7 PG (ref 27–31)
MCHC RBC AUTO-ENTMCNC: 31.1 G/DL (ref 32–36)
MCV RBC AUTO: 92.5 FL (ref 80–94)
MONOCYTES # BLD MANUAL: 0 10^3/UL (ref 0–1)
MONOCYTES NFR BLD AUTO: 8.6 %
NEUTROPHILS # SNV AUTO: 4.6 X10^3/UL (ref 4.8–10.8)
NEUTROPHILS NFR BLD AUTO: 65.7 %
NEUTROPHILS NFR BLD MANUAL: 2.9 10^3/UL (ref 1.5–6.6)
NEUTS BAND NFR BLD: 0 %
PDW BLD AUTO: 10.3 FL (ref 7.4–11.4)
PLATELET # BLD: 84 10^3/UL (ref 130–450)
PLATELET BLD QL SMEAR: (no result)
POTASSIUM SERPL-SCNC: 3.7 MMOL/L (ref 3.5–4.5)
PROTHROM ACT/NOR PPP: 49 SECS (ref 9.9–12.6)
RBC MAR: 4.77 10^6/UL (ref 4.7–6.1)
RBC MORPH BLD: (no result)
SODIUM SERPLBLD-SCNC: 141 MMOL/L (ref 135–145)

## 2024-06-29 VITALS — SYSTOLIC BLOOD PRESSURE: 138 MMHG | DIASTOLIC BLOOD PRESSURE: 79 MMHG | OXYGEN SATURATION: 96 %

## 2024-06-29 LAB
ANION GAP SERPL CALCULATED.4IONS-SCNC: 6 MMOL/L (ref 6–13)
BASOPHILS NFR BLD AUTO: 0 10^3/UL (ref 0–0.1)
BASOPHILS NFR BLD AUTO: 0.2 %
BUN SERPL-MCNC: 14 MG/DL (ref 6–20)
CALCIUM UR-MCNC: 8.8 MG/DL (ref 8.5–10.3)
CHLORIDE SERPL-SCNC: 106 MMOL/L (ref 101–111)
CO2 SERPL-SCNC: 29 MMOL/L (ref 21–32)
CREAT SERPLBLD-SCNC: 0.8 MG/DL (ref 0.6–1.3)
EOSINOPHIL # BLD AUTO: 0.1 10^3/UL (ref 0–0.7)
EOSINOPHIL NFR BLD AUTO: 1.7 %
ERYTHROCYTE [DISTWIDTH] IN BLOOD BY AUTOMATED COUNT: 14.4 % (ref 12–15)
GFRSERPLBLD MDRD-ARVRAT: 93 ML/MIN/{1.73_M2} (ref 89–?)
GLUCOSE SERPL-MCNC: 96 MG/DL (ref 74–104)
HCT VFR BLD AUTO: 43.5 % (ref 42–52)
HGB UR QL STRIP: 13.9 G/DL (ref 14–18)
INR PPP: 4.2 (ref 0.8–1.2)
LYMPHOCYTES # SPEC AUTO: 1.2 10^3/UL (ref 1.5–3.5)
LYMPHOCYTES NFR BLD AUTO: 24.4 %
MCH RBC QN AUTO: 29.3 PG (ref 27–31)
MCHC RBC AUTO-ENTMCNC: 32 G/DL (ref 32–36)
MCV RBC AUTO: 91.6 FL (ref 80–94)
MONOCYTES # BLD AUTO: 0.4 10^3/UL (ref 0–1)
MONOCYTES NFR BLD AUTO: 8.3 %
NEUTROPHILS # BLD AUTO: 3.2 10^3/UL (ref 1.5–6.6)
NEUTROPHILS # SNV AUTO: 4.8 X10^3/UL (ref 4.8–10.8)
NEUTROPHILS NFR BLD AUTO: 65.2 %
NRBC # BLD AUTO: 0 /100WBC
NRBC # BLD AUTO: 0 X10^3/UL
PDW BLD AUTO: 9.3 FL (ref 7.4–11.4)
PLAT MORPH BLD: (no result)
PLATELET # BLD: 79 10^3/UL (ref 130–450)
PLATELET BLD QL SMEAR: (no result)
POTASSIUM SERPL-SCNC: 3.8 MMOL/L (ref 3.5–4.5)
PROTHROM ACT/NOR PPP: 41.1 SECS (ref 9.9–12.6)
RBC MAR: 4.75 10^6/UL (ref 4.7–6.1)
SODIUM SERPLBLD-SCNC: 141 MMOL/L (ref 135–145)

## 2024-06-29 RX ADMIN — SODIUM CHLORIDE SCH: 9 INJECTION, SOLUTION INTRAVENOUS at 12:52

## 2024-06-29 RX ADMIN — SODIUM CHLORIDE SCH MLS/HR: 9 INJECTION, SOLUTION INTRAVENOUS at 12:31

## 2024-06-29 RX ADMIN — SODIUM CHLORIDE STA MLS/HR: 9 INJECTION, SOLUTION INTRAVENOUS at 10:57

## 2024-06-29 RX ADMIN — SODIUM CHLORIDE SCH MLS/HR: 9 INJECTION, SOLUTION INTRAVENOUS at 13:57
